# Patient Record
(demographics unavailable — no encounter records)

---

## 2024-12-13 NOTE — ASSESSMENT
[FreeTextEntry1] :  Ms. DE JESUS is a 67 year female nonsmoker with a history of CAD, anxiety, Colonic polyps, COVID 19 (9/2023), HLD, Hypothyroid, Osteoarthritis, Osteopenia, Prediabetes, seasonal/environmental Allergies, GERD who now comes in for an initial pulmonary evaluation for SOB, ? PAH, seasonal/environmental Allergies, GERD/esophageal spasms, ?JACKIE   The patient's SOB is felt to be multifactorial: -out of shape/overweight -poor mechanics of breathing -Pulmonary     -? PAH     -? mild asthma -Cardiac(Patcha)     -Valve issues   Problem 1: ? PAH -recommend RHC with Romel Osorio to see if PAH is real or not -Recommend CTA  of chest -recommend BNP bloodtest Consider V/Q scan if indeed pt has PAH CAT scans are the only radiological modality to identify abnormalities w/in the lungs with regards to nodules/masses/lymph nodes. Risks, benefits were reviewed in detail. The guidelines for abnormalities include follow up CT scans at various intervals which could range from 6 weeks to 1 year intervals. If there is a change for the worse then consideration for a biopsy will be considered if you are a candidate. Second opinion evaluation with a thoracic surgeon or an interventional radiologist could be offered. -PAH is a rare form of pulmonary hypertension that affects the arteries in the lungs and the right side of the heart, which is responsible for receiving blood that is low in oxygen and pumping it to the lungs for reoxygenation. There is slower blood flow to the lungs in PAH patients, which the heart tries to compensate for by working harder to pump blood through the lungs. This can cause damage to the organ and result in symptoms like shortness of breath, chest pain, and dizziness. There is no cure for PAH, and it often leads to right heart failure and death. Untreated, less than half of the patients survive beyond 3 years.  Problem 2: mild asthma (RF: allergies, GERD)   -add Albuterol via inhaler 2 puffs up to Q6H, pre-exercise PRN -Inhaler technique reviewed as well as oral hygiene technique reviewed with patient. Avoidance of cold air, extremes of temperature, rescue inhaler should be used before exercise. Order of medication reviewed with patient. Recommended use of a cool mist humidifier in the bedroom.  Asthma is believed to be caused by inherited (genetic) and environmental factor, but its exact is unknown. Asthma may be triggered by allergens, lung infections, or irritants in the air, Asthma triggers are different for each person.   Problem 3: AllergySinus -get Blood work to include: asthma panel, food IgE panel, IgE level, eosinophil level,vitamin D level - Azelastine/Flonase (PRN)  Environmental measures for allergies were encouraged including mattress and pillow cover, air purifier, and environmental controls.   Problem 4: GERD  -add Pepcid 40 mg QHS -recommend Reflux Gourmet  -Rule of 2s: avoid eating too much, eating too late, eating too spicy, eating two hours before bed. - Things to avoid including overeating, spicy foods, tight clothing, eating within two hours of bed, this list is not all inclusive. - For treatments of reflux, possible options discussed including diet control, H2 blockers, PPIs, as well as coating motility agents discussed as treatment options. Timing of meals and proximity of last meal to sleep were discussed. If symptoms persist, a formal gastrointestinal evaluation is needed.   Problem 5: r/o JACKIE (associated with PAH) -complete home sleep study -Sleep apnea is associated with adverse clinical consequences which can affect most organ systems. Cardiovascular disease risk includes arrhythmias, atrial fibrillation, hypertension, coronary artery disease, and stroke. Metabolic disorders include diabetes type 2, non-alcoholic fatty liver disease. Mood disorder especially depression; and cognitive decline especially in the elderly. Associations with chronic reflux/Barretts esophagus some but not all inclusive. -Reasons include arousal consistent with hypopnea; respiratory events most prominent in REM sleep or supine position; therefore sleep staging and body position are important for accurate diagnosis and estimation of AHI.   Problem 6: Cardiac (Valve issues) -Recommend cardiac follow up evaluation with cardiologist if needed(Siddhartha)   Problem 7: overweight/out of shape -Recommended Gopal Horton's 10-day detox diet and book. - Weight loss, exercise and diet control were discussed and are highly encouraged. Treatment options were given such as aqua therapy, and contacting a nutritionist. Recommended to use the elliptical, stationary bike, less use of treadmill. Mindful eating was explained to the patient. Obesity is associated with worsening asthma, SOB, and potential for cardiac disease, diabetes, and other underlying medical conditions.   Problem 8: Poor mechanics of breathing -Recommended Wisanti Hof and Buteyko breathing techniques -Recommended [email or web address] and to YouTube "aerobic exercises for seniors" -Proper breathing techniques were reviewed with an emphasis on exhalation. Patient instructed to breath in for 1 second and out for four seconds. Patient was encouraged not to talk while walking.   Problem 9: Health Maintenance -recommended Sanotize anti viral nasal spray in case of viral infection -s/p flu shot -recommended strep pneumonia vaccines: Prevnar-20 vaccine, follow by Pneumo vaccine 23 one year following -recommended early intervention for URIs -recommended regular osteoporosis evaluations -recommended early dermatological evaluations -recommended after the age of 50 to the age of 70, colonoscopy every 5 years   f/u in 6-8 weeks pt is encouraged to call or fax the office with any questions or concerns.

## 2024-12-13 NOTE — HISTORY OF PRESENT ILLNESS
[TextBox_4] :  Ms. DE JESUS is a 67 year female nonsmoker with a history of CAD, anxiety, Colonic polyps, COVID 19 (9/2023), HLD, Hypothyroid, Osteoarthritis, Osteopenia, Prediabetes, , seasonal/environmental allergies, GERD who now comes in for an initial pulmonary evaluation. Her chief complaint is  -she notes she feels today her chest feels a little heavy -she denies feeling SOB and no BARON -she notes she think she had COVID 19 3 times but was diagnosed once and when she had it, she felt like it was a cold and was on Paxlovid with relief -she notes sinuses are active and takes an Allegra to control it -she notes allergy sx have itchy eyes and ears and also has esophageal spasms -she notes sometimes when she is anxious, she has chest pain -she notes some difficulty swallowing -she notes she wants to lose more weight -she notes some lower back pain -she notes she has issues falling asleep right away but when she does asleep, she can stay asleep. She notes she has never been a great sleeper -she notes she feels exhausted -she notes she has some issues with memory    -Patient denies any headaches, nausea, vomiting, fever, chills, sweats, chest pain, palpitations, coughing, wheezing, diarrhea, constipation, dysphagia, arthralgias, dizziness, leg swelling, leg pain, itchy eyes, itchy ears, dysphonia, heartburn, reflux or sour taste in mouth.

## 2024-12-13 NOTE — PROCEDURE
[FreeTextEntry1] :  CXR revealed a normal sized heart; there was no evidence of infiltrate or effusion -- A normal appearing chest radiograph.   Full PFT reveals normal flows; FEV1 was 2.30 L which is 97 %,  with a 15% improvement on Bronchodilator of predicted, normal lung volumes, normal diffusions, at 14.49 L which is 72% predicted, normal flow volume loop. PFT's for performed to evaluate for SOB.    Six Minute Walk test reveals a low saturation of 92% with no evidence of Dyspnea or Fatigue; walked 440.1 meters.   FENO was 13; a normal value being less than 25. Fractional exhaled nitric oxide (FENO) is regarded as a simple, noninvasive method for assessing eosinophilic airway inflammation. Produced by a variety of cells within the lung, nitric oxide (NO) concentrations are generally low in healthy individuals. However, high concentrations of NO appear to be involved in nonspecific host defense mechanisms and chronic inflammatory diseases such as asthma. The American Thoracic Society (ATS) therefore has strongly recommended using FENO to aid in the assessment, management, and long-term monitoring of eosinophilic airway inflammation and asthma, and for identifying steroid responsive individuals whose chronic respiratory symptoms may be caused by airway inflammation. In their 2011 clinical practice guideline, the ATS emphasizes the importance of using FENO.

## 2024-12-13 NOTE — ADDENDUM
[FreeTextEntry1] :  Documented by Vikram Mg acting as a scribe for Dr. Isak Rizo on 12/13/2024 .   All medical record entries made by the Scribe were at my, Dr. Isak Rizo's direction and personally dictated by me on 12/13/2024 . I have reviewed the chart and agree that the record accurately reflects my personal performance of the history, Physical exam, assessment, and plan. I have also personally directed, reviewed, and agree with the discharge instructions.

## 2024-12-13 NOTE — RESULTS/DATA
[TextEntry] : Echo(12/2024) reveals elevated right ventral systolic pressure 11.6, mild pulmonary HTN  CXR(11/2024) reveals no active pulmonary disease

## 2024-12-13 NOTE — PHYSICAL EXAM
[No Acute Distress] : no acute distress [Normal Oropharynx] : normal oropharynx [III] : Mallampati Class: III [Normal Appearance] : normal appearance [No Neck Mass] : no neck mass [Normal Rate/Rhythm] : normal rate/rhythm [Normal S1, S2] : normal s1, s2 [No Murmurs] : no murmurs [No Resp Distress] : no resp distress [Clear to Auscultation Bilaterally] : clear to auscultation bilaterally [No Abnormalities] : no abnormalities [Benign] : benign [Normal Gait] : normal gait [No Clubbing] : no clubbing [No Cyanosis] : no cyanosis [No Edema] : no edema [FROM] : FROM [Normal Color/ Pigmentation] : normal color/ pigmentation [No Focal Deficits] : no focal deficits [Oriented x3] : oriented x3 [Normal Affect] : normal affect [TextBox_2] : M3 [TextBox_68] :  I:E 1:3; Clear

## 2024-12-13 NOTE — REASON FOR VISIT
[Initial] : an initial visit [TextBox_44] :  SOB, ? PAH, , seasonal/environmental Allergies, GERD/esophageal spasms, ?JACKIE

## 2024-12-23 NOTE — HISTORY OF PRESENT ILLNESS
[Patient reported bone density results were abnormal] : Patient reported bone density results were abnormal [Currently Active] : currently active [TextBox_4] : Reva is a 66 y/o  who presents today for an annual exam with request for estradiol refill.   She has a history of osteopenia and is due for a repeat DEXA . She is s/p a mammo today.  She will be undergoing cardiac work up for possible pulmonary hypertension [Mammogramdate] : 12/22/23 [BoneDensityDate] : 12/21/22 [TextBox_37] : osteopenia [ColonoscopyDate] : 12/2024

## 2025-01-09 NOTE — HISTORY OF PRESENT ILLNESS
[TextBox_4] :  Ms. DE JESUS is a 67 year female nonsmoker with a history of CAD, anxiety, Colonic polyps, COVID 19 (9/2023), HLD, Hypothyroid, Osteoarthritis, Osteopenia, Prediabetes, , seasonal/environmental allergies, GERD who now comes in for a follow-up pulmonary evaluation. Her chief complaint is  -she notes she is on a Medrol Dosepak for an infection -she notes s/p cardiac catheterization which reveals PAH -she notes sometimes having some chest pressure, but she is more aware of it now -she notes she hasn't needed her inhaler -she notes good quality of sleep  -she denies any headaches, nausea, emesis, fever, chills, sweats, chest pain, chest pressure, coughing, wheezing, palpitations, diarrhea, constipation, dysphagia, vertigo, arthralgias, myalgias, leg swelling, itchy eyes, itchy ears, heartburn, reflux, or sour taste in the mouth.

## 2025-01-09 NOTE — PROCEDURE
[FreeTextEntry1] : RHC reveals systolic pressure SPAP 36 DPAP 15 MPAP 24 (elevated) no change with nitric oxide (non-responder) PVR 2.6 TORRES /84 PCWP 10

## 2025-01-09 NOTE — REASON FOR VISIT
[Follow-Up] : a follow-up visit [TextBox_44] :  SOB, (+) PAH, , seasonal/environmental Allergies, GERD/esophageal spasms, ?JACKIE

## 2025-01-09 NOTE — ADDENDUM
[FreeTextEntry1] : Documented by Cleveland Toney acting as a scribe for Dr. Isak Rizo on 01/09/2025. All medical record entries made by the Scribe were at my, Dr. Isak Rizo's, direction and personally dictated by me on 01/09/2025. I have reviewed the chart and agree that the record accurately reflects my personal performance of the history, physical exam, assessment and plan. I have also personally directed, reviewed, and agree with the discharge instructions.

## 2025-01-09 NOTE — PHYSICAL EXAM
[No Acute Distress] : no acute distress [Normal Oropharynx] : normal oropharynx [III] : Mallampati Class: III [Normal Appearance] : normal appearance [No Neck Mass] : no neck mass [Normal Rate/Rhythm] : normal rate/rhythm [Normal S1, S2] : normal s1, s2 [No Murmurs] : no murmurs [No Resp Distress] : no resp distress [Clear to Auscultation Bilaterally] : clear to auscultation bilaterally [No Abnormalities] : no abnormalities [Benign] : benign [Normal Gait] : normal gait [No Clubbing] : no clubbing [No Cyanosis] : no cyanosis [No Edema] : no edema [FROM] : FROM [Normal Color/ Pigmentation] : normal color/ pigmentation [No Focal Deficits] : no focal deficits [Oriented x3] : oriented x3 [Normal Affect] : normal affect [TextBox_68] :  I:E 1:3; Clear

## 2025-01-09 NOTE — ASSESSMENT
[FreeTextEntry1] :  Ms. DE JESUS is a 67 year female nonsmoker with a history of CAD, anxiety, Colonic polyps, COVID 19 (9/2023), HLD, Hypothyroid, Osteoarthritis, Osteopenia, Prediabetes, seasonal/environmental Allergies, GERD who now comes in for a follow-up pulmonary evaluation for SOB, (+) PAH, seasonal/environmental Allergies, GERD/esophageal spasms, ?JACKIE - now with PAH confirmed   The patient's SOB is felt to be multifactorial: -out of shape/overweight -poor mechanics of breathing -Pulmonary     -(+) PAH     -? mild asthma -Cardiac(Patcha)     -Valve issues   Problem 1: (+) PAH - WHO group 1 -s/p RHC with Romel Osorio (confirmed) -s/p CTA  of chest (+) -s/p BNP blood test WNL - move to combination -add Opsynvi daily (ETRA/PDE-5) -follow-up LFTs/CBC monthly -follow-up BNP, 6-minute walk test, PFTs, and yearly ECHO Consider V/Q scan if indeed pt has PAH CAT scans are the only radiological modality to identify abnormalities w/in the lungs with regards to nodules/masses/lymph nodes. Risks, benefits were reviewed in detail. The guidelines for abnormalities include follow up CT scans at various intervals which could range from 6 weeks to 1 year intervals. If there is a change for the worse then consideration for a biopsy will be considered if you are a candidate. Second opinion evaluation with a thoracic surgeon or an interventional radiologist could be offered. -PAH is a rare form of pulmonary hypertension that affects the arteries in the lungs and the right side of the heart, which is responsible for receiving blood that is low in oxygen and pumping it to the lungs for reoxygenation. There is slower blood flow to the lungs in PAH patients, which the heart tries to compensate for by working harder to pump blood through the lungs. This can cause damage to the organ and result in symptoms like shortness of breath, chest pain, and dizziness. There is no cure for PAH, and it often leads to right heart failure and death. Untreated, less than half of the patients survive beyond 3 years.  Problem 2: mild asthma (RF: allergies, GERD)   -add Albuterol via inhaler 2 puffs up to Q6H, pre-exercise PRN -Inhaler technique reviewed as well as oral hygiene technique reviewed with patient. Avoidance of cold air, extremes of temperature, rescue inhaler should be used before exercise. Order of medication reviewed with patient. Recommended use of a cool mist humidifier in the bedroom.  Asthma is believed to be caused by inherited (genetic) and environmental factor, but its exact is unknown. Asthma may be triggered by allergens, lung infections, or irritants in the air, Asthma triggers are different for each person.   Problem 3: AllergySinus -s/p Blood work to include: asthma panel (+), food IgE panel(-), IgE level(-), eosinophil level (-), vitamin D level - Azelastine/Flonase (PRN)  Environmental measures for allergies were encouraged including mattress and pillow cover, air purifier, and environmental controls.  Problem 3A: Low Vitamin D -on Rx -Low vitamin D levels have been associated with asthma exacerbations and increased allergic symptoms. The goal, based on recent information, is maintaining levels between 50-70 and low normal is 30. Recommended 50,000 units every two weeks to once a month depending on the level.   Problem 4: GERD  -add Pepcid 40 mg QHS -recommend Reflux Gourmet  -Rule of 2s: avoid eating too much, eating too late, eating too spicy, eating two hours before bed. - Things to avoid including overeating, spicy foods, tight clothing, eating within two hours of bed, this list is not all inclusive. - For treatments of reflux, possible options discussed including diet control, H2 blockers, PPIs, as well as coating motility agents discussed as treatment options. Timing of meals and proximity of last meal to sleep were discussed. If symptoms persist, a formal gastrointestinal evaluation is needed.   Problem 5: r/o JACKIE (associated with PAH)- NC -complete home sleep study -Sleep apnea is associated with adverse clinical consequences which can affect most organ systems. Cardiovascular disease risk includes arrhythmias, atrial fibrillation, hypertension, coronary artery disease, and stroke. Metabolic disorders include diabetes type 2, non-alcoholic fatty liver disease. Mood disorder especially depression; and cognitive decline especially in the elderly. Associations with chronic reflux/Barretts esophagus some but not all inclusive. -Reasons include arousal consistent with hypopnea; respiratory events most prominent in REM sleep or supine position; therefore sleep staging and body position are important for accurate diagnosis and estimation of AHI.   Problem 6: Cardiac (Valve issues) -Recommend cardiac follow up evaluation with cardiologist if needed(Sulaiman Schneider   Problem 7: overweight/out of shape -Recommended Gopal Horton's 10-day detox diet and book. - Weight loss, exercise and diet control were discussed and are highly encouraged. Treatment options were given such as aqua therapy, and contacting a nutritionist. Recommended to use the elliptical, stationary bike, less use of treadmill. Mindful eating was explained to the patient. Obesity is associated with worsening asthma, SOB, and potential for cardiac disease, diabetes, and other underlying medical conditions.   Problem 8: Poor mechanics of breathing -Recommended Wim Hof and Buteyko breathing techniques -Recommended [email or web address] and to YouTube "aerobic exercises for seniors" -Proper breathing techniques were reviewed with an emphasis on exhalation. Patient instructed to breath in for 1 second and out for four seconds. Patient was encouraged not to talk while walking.   Problem 9: Health Maintenance -recommended pulmonary rehab ([facility]) -s/p RSV  -recommended Sanotize anti viral nasal spray in case of viral infection -s/p flu shot -recommended strep pneumonia vaccines: Prevnar-20 vaccine, follow by Pneumo vaccine 23 one year following -recommended early intervention for URIs -recommended regular osteoporosis evaluations -recommended early dermatological evaluations -recommended after the age of 50 to the age of 70, colonoscopy every 5 years   f/u in 6-8 weeks pt is encouraged to call or fax the office with any questions or concerns.

## 2025-01-23 NOTE — REVIEW OF SYSTEMS
[Fatigue] : fatigue [Cough] : cough [Fever] : no fever [Chills] : no chills [Earache] : no earache [Nocturia] : no nocturia [Joint Swelling] : no joint swelling [Anxiety] : no anxiety

## 2025-01-23 NOTE — HISTORY OF PRESENT ILLNESS
[FreeTextEntry8] :  67 year female nonsmoker with a history of CAD, anxiety, Colonic polyps, COVID 19 (9/2023), HLD, Hypothyroid, Osteoarthritis, Osteopenia, Prediabetes, , seasonal/environmental allergies, GERD, new diagnosis of Pulmonary HTN, mod JACKIE  s  initial illness 2-3 weeks was at  for negative for flu covid completed Medrol pack, got script for agumentin did not take Now new symtoms1/17/24 went to  1/19/ 25 negative flu and covid, advised Augmentin(she started as already had) and advised benzoate now on day 3 Augmentin, did not start the benzoate due to concern interaction with bupropion.

## 2025-01-23 NOTE — PHYSICAL EXAM
[No Acute Distress] : no acute distress [Well-Appearing] : well-appearing [Normal Outer Ear/Nose] : the outer ears and nose were normal in appearance [Normal Oropharynx] : the oropharynx was normal [Clear to Auscultation] : lungs were clear to auscultation bilaterally [Soft] : abdomen soft [Non-distended] : non-distended [Normal Posterior Cervical Nodes] : no posterior cervical lymphadenopathy [Normal Anterior Cervical Nodes] : no anterior cervical lymphadenopathy [Normal Gait] : normal gait [Normal Affect] : the affect was normal

## 2025-01-27 NOTE — HISTORY OF PRESENT ILLNESS
[FreeTextEntry8] : Acute care visit  Patient still not feeling well, increased cough and mucous She became sick on 1/17, she was evaluated at Urgent Care on 1/19 and prescribed Augmentin She was seen in our office on 1/23, because she was not getting better.  stopped augmentin and started Zpack She has finished Zpack and still feels chest tightness, congestion, deep cough, cough spasms, mild wheeze She was diagnosed with mild asthma and PAH in 2024.

## 2025-02-04 NOTE — HISTORY OF PRESENT ILLNESS
[TextBox_4] :  Ms. DE JESUS is a 67 year female nonsmoker with a history of CAD, anxiety, Colonic polyps, COVID 19 (9/2023), HLD, Hypothyroid, Osteoarthritis, Osteopenia, Prediabetes, seasonal/environmental allergies, GERD who now comes in for a follow-up pulmonary evaluation. Her chief complaint is  -she notes s/p URI -she notes chest pressure  -she notes being put on a Z pack  -she notes frequent cough  -she notes being put on a Medrol dose pack  -she notes her cough is no longer productive  -she notes being in denial about the accuracy of the exam  -she notes needing to go to PT  -she denies exercising -she notes possibly having osteoporosis   -she denies any headaches, nausea, emesis, fever, chills, sweats, chest pain, chest pressure, wheezing, palpitations, diarrhea, constipation, dysphagia, vertigo, myalgias, leg swelling, itchy eyes, itchy ears, heartburn, reflux, or sour taste in the mouth.

## 2025-02-04 NOTE — ASSESSMENT
[FreeTextEntry1] :  Ms. DE JESUS is a 67 year female nonsmoker with a history of CAD, anxiety, Colonic polyps, COVID 19 (9/2023), HLD, Hypothyroid, Osteoarthritis, Osteopenia, Prediabetes, seasonal/environmental Allergies, GERD who now comes in for a follow-up pulmonary evaluation for SOB, (+) PAH, seasonal/environmental Allergies, GERD/esophageal spasms, ?JACKIE - now with PAH confirmed, OSAS; post URI bronchospasm    The patient's SOB is felt to be multifactorial: -out of shape/overweight -poor mechanics of breathing -Pulmonary     -(+) PAH     -? mild asthma -Cardiac([place])     -Valve issues   Problem 1: (+) PAH - WHO group 1 -s/p RHC with Romel Osorio (confirmed) -s/p CTA  of chest (+) -s/p BNP blood test WNL - move to combination -add Opsynvi daily (ETRA/PDE-5) -follow-up LFTs/CBC monthly -follow-up BNP, 6-minute walk test, PFTs, and yearly ECHO Consider V/Q scan if indeed pt has PAH CAT scans are the only radiological modality to identify abnormalities w/in the lungs with regards to nodules/masses/lymph nodes. Risks, benefits were reviewed in detail. The guidelines for abnormalities include follow up CT scans at various intervals which could range from 6 weeks to 1 year intervals. If there is a change for the worse then consideration for a biopsy will be considered if you are a candidate. Second opinion evaluation with a thoracic surgeon or an interventional radiologist could be offered. -PAH is a rare form of pulmonary hypertension that affects the arteries in the lungs and the right side of the heart, which is responsible for receiving blood that is low in oxygen and pumping it to the lungs for reoxygenation. There is slower blood flow to the lungs in PAH patients, which the heart tries to compensate for by working harder to pump blood through the lungs. This can cause damage to the organ and result in symptoms like shortness of breath, chest pain, and dizziness. There is no cure for PAH, and it often leads to right heart failure and death. Untreated, less than half of the patients survive beyond 3 years.  Problem 2: mild asthma (RF: allergies, GERD) (active)    -add Albuterol via inhaler 2 puffs up to Q6H, pre-exercise PRN -add Symbicort 160 2 inhalations BID -Inhaler technique reviewed as well as oral hygiene technique reviewed with patient. Avoidance of cold air, extremes of temperature, rescue inhaler should be used before exercise. Order of medication reviewed with patient. Recommended use of a cool mist humidifier in the bedroom.  Asthma is believed to be caused by inherited (genetic) and environmental factor, but its exact is unknown. Asthma may be triggered by allergens, lung infections, or irritants in the air, Asthma triggers are different for each person.   Problem 3: AllergySinus -s/p Blood work to include: asthma panel (+), food IgE panel(-), IgE level(-), eosinophil level (-), vitamin D level - Azelastine/Flonase (PRN)  Environmental measures for allergies were encouraged including mattress and pillow cover, air purifier, and environmental controls.  Problem 3A: Low Vitamin D -on Rx -Low vitamin D levels have been associated with asthma exacerbations and increased allergic symptoms. The goal, based on recent information, is maintaining levels between 50-70 and low normal is 30. Recommended 50,000 units every two weeks to once a month depending on the level.   Problem 4: GERD  -add Pepcid 40 mg QHS -recommend Reflux Gourmet  -Rule of 2s: avoid eating too much, eating too late, eating too spicy, eating two hours before bed. - Things to avoid including overeating, spicy foods, tight clothing, eating within two hours of bed, this list is not all inclusive. - For treatments of reflux, possible options discussed including diet control, H2 blockers, PPIs, as well as coating motility agents discussed as treatment options. Timing of meals and proximity of last meal to sleep were discussed. If symptoms persist, a formal gastrointestinal evaluation is needed.   Problem 5: (+) JACKIE (associated with PAH)- 17.4 (moderate) -s/p  home sleep study - [facility]  -Sleep apnea is associated with adverse clinical consequences which can affect most organ systems. Cardiovascular disease risk includes arrhythmias, atrial fibrillation, hypertension, coronary artery disease, and stroke. Metabolic disorders include diabetes type 2, non-alcoholic fatty liver disease. Mood disorder especially depression; and cognitive decline especially in the elderly. Associations with chronic reflux/Barretts esophagus some but not all inclusive. -Reasons include arousal consistent with hypopnea; respiratory events most prominent in REM sleep or supine position; therefore sleep staging and body position are important for accurate diagnosis and estimation of AHI.   Problem 6: Cardiac (Valve issues) -Recommend cardiac follow up evaluation with cardiologist if neededRomel Schneider ([place])   Problem 7: overweight/out of shape -Recommended Gopal Horton's 10-day detox diet and book. - Weight loss, exercise and diet control were discussed and are highly encouraged. Treatment options were given such as aqua therapy, and contacting a nutritionist. Recommended to use the elliptical, stationary bike, less use of treadmill. Mindful eating was explained to the patient. Obesity is associated with worsening asthma, SOB, and potential for cardiac disease, diabetes, and other underlying medical conditions.   Problem 8: Poor mechanics of breathing -Recommended Saman Skelton breathing techniques -Recommended [email or web address] and to YouTube "aerobic exercises for seniors" -Proper breathing techniques were reviewed with an emphasis on exhalation. Patient instructed to breath in for 1 second and out for four seconds. Patient was encouraged not to talk while walking.   Problem 9: Health Maintenance -recommended pulmonary rehab ([facility]) -s/p RSV  -recommended Sanotize anti viral nasal spray in case of viral infection -s/p flu shot -recommended strep pneumonia vaccines: Prevnar-20 vaccine, follow by Pneumo vaccine 23 one year following -recommended early intervention for URIs -recommended regular osteoporosis evaluations -recommended early dermatological evaluations -recommended after the age of 50 to the age of 70, colonoscopy every 5 years   f/u in 6-8 weeks pt is encouraged to call or fax the office with any questions or concerns.

## 2025-02-04 NOTE — ADDENDUM
[FreeTextEntry1] : Documented by Omid Barrera acting as a scribe for Dr. Isak Rizo on 02/04/2025. All medical record entries made by the Scribe were at my, Dr. Isak Rizo's, direction and personally dictated by me on 02/04/2025. I have reviewed the chart and agree that the record accurately reflects my personal performance of the history, physical exam, assessment and plan. I have also personally directed, reviewed, and agree with the discharge instructions.

## 2025-02-04 NOTE — REASON FOR VISIT
[Follow-Up] : a follow-up visit [TextBox_44] :  SOB, (+) PAH, , seasonal/environmental Allergies, GERD/esophageal spasms, (+)JACKIE

## 2025-02-04 NOTE — PROCEDURE
[FreeTextEntry1] : RHC reveals systolic pressure SPAP 36 DPAP 15 MPAP 24 (elevated) no change with nitric oxide (non-responder) PVR 2.6 TORRES /84 PCWP 10   Sleep study (1/2025) revealed mild sleep apnea with an AHI/JAYCE of 18

## 2025-02-13 NOTE — REVIEW OF SYSTEMS
[Negative] : Psychiatric [Nasal Congestion] : nasal congestion [Cough] : cough [SOB on Exertion] : sob on exertion [Thyroid Problem] : thyroid problem

## 2025-02-13 NOTE — HISTORY OF PRESENT ILLNESS
[TextBox_4] :  Ms. DE JESUS is a 67 year female nonsmoker with a history of CAD, anxiety, Colonic polyps, COVID 19 (9/2023), HLD, Hypothyroid, Osteoarthritis, Osteopenia, Prediabetes, seasonal/environmental allergies, GERD who now comes in for a follow-up pulmonary evaluation. Her chief complaint is  -reports she still has nasal congestion that is lingering after recent URI in 1/2025 s/p medrol x 2 and Zpack; still feeling fatigue and voice is not normal -reports is taking symbicort -reports using allegra; not using nasal sprays -reports she is starting pulmonary rehab -reports taking OTC Pepcid -reports she has appt with Dr. Correa for DD next week -reports she takes a long time falling asleep -reports feeling overwhelmed with recent diagnoses of PAH and osteoporosis  -she denies any headaches, nausea, emesis, fever, chills, sweats, chest pain, chest pressure, wheezing, palpitations, diarrhea, constipation, dysphagia, vertigo, myalgias, leg swelling, itchy eyes, itchy ears, heartburn, reflux, or sour taste in the mouth.

## 2025-02-13 NOTE — ASSESSMENT
[FreeTextEntry1] :  Ms. DE JESUS is a 67 year female nonsmoker with a history of CAD, anxiety, Colonic polyps, COVID 19 (9/2023), HLD, Hypothyroid, Osteoarthritis, Osteopenia, Prediabetes, now comes in for a follow-up pulmonary evaluation for SOB, (+) PAH, seasonal/environmental Allergies, GERD/esophageal spasms, moderate JACKIE - now with PAH confirmed, post URI bronchospasm    The patient's SOB is felt to be multifactorial: -deconditioned -poor mechanics of breathing -Pulmonary  -(+) PAH  -? mild asthma -Cardiac(Patcha)  -Valve issues   Problem 1: URI (slowly resolving) -s/p Z pack (1/2025) -s/p Medrol pack x 2 (1/2025)  Problem 2: PND/Sinus/allergies (active) -Add Azelastine 1 spray BID -Add Flonase 1 spray BID -continue Allegra -Recommended Alkolol rinse -s/p Blood work to include: asthma panel (+), food IgE panel(-), IgE level(-), eosinophil level (-), vitamin D level  Environmental measures for allergies were encouraged including mattress and pillow cover, air purifier, and environmental controls.  Problem 2A: Low Vitamin D -on Rx -Low vitamin D levels have been associated with asthma exacerbations and increased allergic symptoms. The goal, based on recent information, is maintaining levels between 50-70 and low normal is 30. Recommended 50,000 units every two weeks to once a month depending on the level.  Problem 3: (+) PAH - WHO group 1 -s/p RHC with Romel Osorio (confirmed) -s/p CTA  of chest (+) -s/p BNP blood test WNL - move to combination -add Opsynvi daily (ETRA/PDE-5)- pending. -follow-up LFTs/CBC monthly -follow-up BNP, 6-minute walk test, PFTs, and yearly ECHO Consider V/Q scan if indeed pt has PAH CAT scans are the only radiological modality to identify abnormalities w/in the lungs with regards to nodules/masses/lymph nodes. Risks, benefits were reviewed in detail. The guidelines for abnormalities include follow up CT scans at various intervals which could range from 6 weeks to 1 year intervals. If there is a change for the worse then consideration for a biopsy will be considered if you are a candidate. Second opinion evaluation with a thoracic surgeon or an interventional radiologist could be offered. -PAH is a rare form of pulmonary hypertension that affects the arteries in the lungs and the right side of the heart, which is responsible for receiving blood that is low in oxygen and pumping it to the lungs for reoxygenation. There is slower blood flow to the lungs in PAH patients, which the heart tries to compensate for by working harder to pump blood through the lungs. This can cause damage to the organ and result in symptoms like shortness of breath, chest pain, and dizziness. There is no cure for PAH, and it often leads to right heart failure and death. Untreated, less than half of the patients survive beyond 3 years.  Problem 4: mild asthma (RF: allergies, GERD)  -continue Symbicort 160 2 inhalations BID (rinse and gargle)   -continue Albuterol via inhaler 2 puffs up to Q6H, pre-exercise PRN  Asthma is believed to be caused by inherited (genetic) and environmental factor, but its exact is unknown. Asthma may be triggered by allergens, lung infections, or irritants in the air, Asthma triggers are different for each person.   Problem 5: GERD  -continue Pepcid 40 mg QHS  -Rule of 2s: avoid eating too much, eating too late, eating too spicy, eating two hours before bed. - Things to avoid including overeating, spicy foods, tight clothing, eating within two hours of bed, this list is not all inclusive. - For treatments of reflux, possible options discussed including diet control, H2 blockers, PPIs, as well as coating motility agents discussed as treatment options. Timing of meals and proximity of last meal to sleep were discussed. If symptoms persist, a formal gastrointestinal evaluation is needed.   Problem 6: (+) JACKIE with nocturnal hypoxemia (associated with PAH) -s/p  home sleep study AHI 17.4 (moderate) with desaturations <88% for 80 min - Dr. alves -DD; confirmed DD as treatment plan with Dr. Rizo  -Plan for HST with DD efficacy study after DD obtained -Sleep apnea is associated with adverse clinical consequences which can affect most organ systems. Cardiovascular disease risk includes arrhythmias, atrial fibrillation, hypertension, coronary artery disease, and stroke. Metabolic disorders include diabetes type 2, non-alcoholic fatty liver disease. Mood disorder especially depression; and cognitive decline especially in the elderly. Associations with chronic reflux/Barretts esophagus some but not all inclusive. -Reasons include arousal consistent with hypopnea; respiratory events most prominent in REM sleep or supine position; therefore sleep staging and body position are important for accurate diagnosis and estimation of AHI.   Problem 7: Cardiac (Valve issues) -Recommend cardiac follow up evaluation with cardiologist if needed (Siddhartha)    Problem 8: Weight management -Recommended Gopal Horton's 10-day detox diet and book. - Weight loss, exercise and diet control were discussed and are highly encouraged. Treatment options were given such as aqua therapy, and contacting a nutritionist. Recommended to use the elliptical, stationary bike, less use of treadmill. Mindful eating was explained to the patient. Obesity is associated with worsening asthma, SOB, and potential for cardiac disease, diabetes, and other underlying medical conditions.   Problem 9: Poor mechanics of breathing -Recommended Saman Skelton breathing techniques -Recommended [email or web address] and to YouTube "aerobic exercises for seniors" -Proper breathing techniques were reviewed with an emphasis on exhalation. Patient instructed to breath in for 1 second and out for four seconds. Patient was encouraged not to talk while walking.   Problem 10: Health Maintenance -recommended pulmonary rehab ([facility]) -s/p RSV  -s/p flu shot -recommended strep pneumonia vaccines: Prevnar-20 vaccine, follow by Pneumo vaccine 23 one year following -recommended early intervention for URIs -recommended regular osteoporosis evaluations -recommended early dermatological evaluations -recommended after the age of 50 to the age of 70, colonoscopy every 5 years   f/u in March with Dr Rizo pt is encouraged to call or fax the office with any questions or concerns.

## 2025-02-13 NOTE — ASSESSMENT
[FreeTextEntry1] :  Ms. DE JESUS is a 67 year female nonsmoker with a history of CAD, anxiety, Colonic polyps, COVID 19 (9/2023), HLD, Hypothyroid, Osteoarthritis, Osteopenia, Prediabetes, now comes in for a follow-up pulmonary evaluation for SOB, (+) PAH, seasonal/environmental Allergies, GERD/esophageal spasms, moderate JACKIE - now with PAH confirmed, post URI bronchospasm    The patient's SOB is felt to be multifactorial: -deconditioned -poor mechanics of breathing -Pulmonary  -(+) PAH  -? mild asthma -Cardiac(Patch[place])  -Valve issues   Problem 1: URI (slowly resolving) -s/p Z pack (1/2025) -s/p Medrol pack x 2 (1/2025)  Problem 2: PND/Sinus/allergies (active) -Add Azelastine 1 spray BID -Add Flonase 1 spray BID -continue Allegra -Recommended Alkolol rinse -s/p Blood work to include: asthma panel (+), food IgE panel(-), IgE level(-), eosinophil level (-), vitamin D level  Environmental measures for allergies were encouraged including mattress and pillow cover, air purifier, and environmental controls.  Problem 2A: Low Vitamin D -on Rx -Low vitamin D levels have been associated with asthma exacerbations and increased allergic symptoms. The goal, based on recent information, is maintaining levels between 50-70 and low normal is 30. Recommended 50,000 units every two weeks to once a month depending on the level.  Problem 3: (+) PAH - WHO group 1 -s/p RHC with Roeml Osorio (confirmed) -s/p CTA  of chest (+) -s/p BNP blood test WNL - move to combination -add Opsynvi daily (ETRA/PDE-5)- pending. -follow-up LFTs/CBC monthly -follow-up BNP, 6-minute walk test, PFTs, and yearly ECHO Consider V/Q scan if indeed pt has PAH CAT scans are the only radiological modality to identify abnormalities w/in the lungs with regards to nodules/masses/lymph nodes. Risks, benefits were reviewed in detail. The guidelines for abnormalities include follow up CT scans at various intervals which could range from 6 weeks to 1 year intervals. If there is a change for the worse then consideration for a biopsy will be considered if you are a candidate. Second opinion evaluation with a thoracic surgeon or an interventional radiologist could be offered. -PAH is a rare form of pulmonary hypertension that affects the arteries in the lungs and the right side of the heart, which is responsible for receiving blood that is low in oxygen and pumping it to the lungs for reoxygenation. There is slower blood flow to the lungs in PAH patients, which the heart tries to compensate for by working harder to pump blood through the lungs. This can cause damage to the organ and result in symptoms like shortness of breath, chest pain, and dizziness. There is no cure for PAH, and it often leads to right heart failure and death. Untreated, less than half of the patients survive beyond 3 years.  Problem 4: mild asthma (RF: allergies, GERD)  -continue Symbicort 160 2 inhalations BID (rinse and gargle)   -continue Albuterol via inhaler 2 puffs up to Q6H, pre-exercise PRN  Asthma is believed to be caused by inherited (genetic) and environmental factor, but its exact is unknown. Asthma may be triggered by allergens, lung infections, or irritants in the air, Asthma triggers are different for each person.   Problem 5: GERD  -continue Pepcid 40 mg QHS  -Rule of 2s: avoid eating too much, eating too late, eating too spicy, eating two hours before bed. - Things to avoid including overeating, spicy foods, tight clothing, eating within two hours of bed, this list is not all inclusive. - For treatments of reflux, possible options discussed including diet control, H2 blockers, PPIs, as well as coating motility agents discussed as treatment options. Timing of meals and proximity of last meal to sleep were discussed. If symptoms persist, a formal gastrointestinal evaluation is needed.   Problem 6: (+) JACKIE with nocturnal hypoxemia (associated with PAH) -s/p  home sleep study AHI 17.4 (moderate) with desaturations <88% for 80 min - Dr. alves -DD; confirmed DD as treatment plan with Dr. Rizo  -Plan for HST with DD efficacy study after DD obtained -Sleep apnea is associated with adverse clinical consequences which can affect most organ systems. Cardiovascular disease risk includes arrhythmias, atrial fibrillation, hypertension, coronary artery disease, and stroke. Metabolic disorders include diabetes type 2, non-alcoholic fatty liver disease. Mood disorder especially depression; and cognitive decline especially in the elderly. Associations with chronic reflux/Barretts esophagus some but not all inclusive. -Reasons include arousal consistent with hypopnea; respiratory events most prominent in REM sleep or supine position; therefore sleep staging and body position are important for accurate diagnosis and estimation of AHI.   Problem 7: Cardiac (Valve issues) -Recommend cardiac follow up evaluation with cardiologist if needed (Siddhartha)    Problem 8: Weight management -Recommended Gopal Horton's 10-day detox diet and book. - Weight loss, exercise and diet control were discussed and are highly encouraged. Treatment options were given such as aqua therapy, and contacting a nutritionist. Recommended to use the elliptical, stationary bike, less use of treadmill. Mindful eating was explained to the patient. Obesity is associated with worsening asthma, SOB, and potential for cardiac disease, diabetes, and other underlying medical conditions.   Problem 9: Poor mechanics of breathing -Recommended Saman Skelton breathing techniques -Recommended [email or web address] and to YouTube "aerobic exercises for seniors" -Proper breathing techniques were reviewed with an emphasis on exhalation. Patient instructed to breath in for 1 second and out for four seconds. Patient was encouraged not to talk while walking.   Problem 10: Health Maintenance -recommended pulmonary [facility]hab ([facility]) -s/p RSV  -s/p flu shot -recommended strep pneumonia vaccines: Prevnar-20 vaccine, follow by Pneumo vaccine 23 one year following -recommended early intervention for URIs -recommended regular osteoporosis evaluations -recommended early dermatological evaluations -recommended after the age of 50 to the age of 70, colonoscopy every 5 years   f/u in March with Dr Rizo pt is encouraged to call or fax the office with any questions or concerns.

## 2025-02-19 NOTE — ADDENDUM
[FreeTextEntry1] : This note was written by Jadyn Hines on 02/19/2025 acting as scribe for Lizeth Akhtar, OTR/L, PA

## 2025-02-19 NOTE — PROCEDURE
[de-identified] : Indication: Osteoarthritis of the right knee   Consent: At this time, I have recommended an injection into the right knee. The risks and benefits of the procedure were discussed with the patient in detail.  Upon verbal consent of the patient, we proceeded with the injection as noted below.   Procedure: After a sterile prep, the patient underwent an injection of 9 mL of 1% Lidocaine (10mg/mL) without epinephrine and 1 mL of triamcinolone acetonide (40mg/mL) into the right knee. The patient tolerated the procedure well.  There were no complications.   :  [employer] Drug name:     Triamcinolone Acetonide Injectable Suspension USP NDC #:            [address]-0 Lot #:               [id number] Expiration:      08/31/2025

## 2025-02-19 NOTE — HISTORY OF PRESENT ILLNESS
Is This A New Presentation, Or A Follow-Up?: Skin Lesion
How Severe Is Your Skin Lesion?: mild
[de-identified] : The patient comes in today with right knee pain.
Has Your Skin Lesion Been Treated?: not been treated

## 2025-02-19 NOTE — DISCUSSION/SUMMARY
[de-identified] : At this time, due to osteoarthritis of the right knee, I recommended a cortisone injection (as noted above). I advised her to ice it.

## 2025-02-19 NOTE — DISCUSSION/SUMMARY
[FreeTextEntry1] : Patient counseled as per above  25 minutes were spent face to face in this visit with greater than 50% of the time spent counseling

## 2025-02-19 NOTE — PHYSICAL EXAM
[de-identified] : Right Knee: Range of Motion in Degrees                                             Claimant:              Normal: Flexion Active:                        135                135-degrees Flexion Passive:                     135                135-degrees Extension Active:                    0-5                 0-5-degrees Extension Passive:                 0-5                 0-5-degrees     No weakness to flexion/extension. No evidence of instability in the AP plane or varus or valgus stress. Negative Lachman. Negative pivot shift.  Negative anterior drawer test.  Negative posterior drawer test. Negative Zander. Negative Apley grind.  No medial or lateral joint line tenderness.  Positive tenderness over the medial and lateral facet of the patella.  Positive patellofemoral crepitations.  No lateral tilting patella.  No patella apprehension.  Positive crepitation in the medial and lateral femoral condyle.  No proximal or distal swelling, edema or tenderness.  No gross motor or sensory deficits.  Mild intra-articular swelling. 2+ DP and PT pulses. No varus or valgus malalignment. Skin is intact. No rashes, scars or lesions.     [de-identified] : Ambulating with a slightly antalgic to antalgic gait.  Station:  Normal.  [de-identified] : Appearance:  Well-developed, well-nourished female in no acute distress.   [de-identified] : Radiographs, which were taken in the office today, two views of the right knee, reveals moderate osteoarthritis of the right knee.

## 2025-02-25 NOTE — ASSESSMENT
[FreeTextEntry1] :  Lab were reviewed in detail with the patient. consider covid vax. All preventative measures were reviewed with the patient and the patient is due for and agrees to the following as outlined  in the plan  below.

## 2025-02-25 NOTE — PHYSICAL EXAM
[No Acute Distress] : no acute distress [Well Nourished] : well nourished [Well Developed] : well developed [Well-Appearing] : well-appearing [Normal Sclera/Conjunctiva] : normal sclera/conjunctiva [PERRL] : pupils equal round and reactive to light [EOMI] : extraocular movements intact [Normal Outer Ear/Nose] : the outer ears and nose were normal in appearance [Normal Oropharynx] : the oropharynx was normal [No JVD] : no jugular venous distention [No Lymphadenopathy] : no lymphadenopathy [Supple] : supple [Thyroid Normal, No Nodules] : the thyroid was normal and there were no nodules present [No Respiratory Distress] : no respiratory distress  [No Accessory Muscle Use] : no accessory muscle use [Clear to Auscultation] : lungs were clear to auscultation bilaterally [Normal Rate] : normal rate  [Regular Rhythm] : with a regular rhythm [Normal S1, S2] : normal S1 and S2 [No Murmur] : no murmur heard [No Carotid Bruits] : no carotid bruits [No Abdominal Bruit] : a ~M bruit was not heard ~T in the abdomen [No Varicosities] : no varicosities [Pedal Pulses Present] : the pedal pulses are present [No Edema] : there was no peripheral edema [No Palpable Aorta] : no palpable aorta [No Extremity Clubbing/Cyanosis] : no extremity clubbing/cyanosis [Soft] : abdomen soft [Non Tender] : non-tender [Non-distended] : non-distended [No Masses] : no abdominal mass palpated [No HSM] : no HSM [Normal Bowel Sounds] : normal bowel sounds [Normal Posterior Cervical Nodes] : no posterior cervical lymphadenopathy [Normal Anterior Cervical Nodes] : no anterior cervical lymphadenopathy [No CVA Tenderness] : no CVA  tenderness [No Spinal Tenderness] : no spinal tenderness [No Joint Swelling] : no joint swelling [Grossly Normal Strength/Tone] : grossly normal strength/tone [No Rash] : no rash [Coordination Grossly Intact] : coordination grossly intact [No Focal Deficits] : no focal deficits [Normal Gait] : normal gait [Deep Tendon Reflexes (DTR)] : deep tendon reflexes were 2+ and symmetric [Normal Affect] : the affect was normal [Normal Insight/Judgement] : insight and judgment were intact [de-identified] : right breast    10  oclock breast position

## 2025-02-25 NOTE — HEALTH RISK ASSESSMENT
[No] : In the past 12 months have you used drugs other than those required for medical reasons? No [0] : 2) Feeling down, depressed, or hopeless: Not at all (0) [Never] : Never [No falls in past year] : Patient reported no falls in the past year [PHQ-2 Negative - No further assessment needed] : PHQ-2 Negative - No further assessment needed [Yes] : takes [NO] : No [None] : None [Alone] : lives alone [] :  [Fully functional (bathing, dressing, toileting, transferring, walking, feeding)] : Fully functional (bathing, dressing, toileting, transferring, walking, feeding) [Fully functional (using the telephone, shopping, preparing meals, housekeeping, doing laundry, using] : Fully functional and needs no help or supervision to perform IADLs (using the telephone, shopping, preparing meals, housekeeping, doing laundry, using transportation, managing medications and managing finances) [Smoke Detector] : smoke detector [Carbon Monoxide Detector] : carbon monoxide detector [Seat Belt] :  uses seat belt [Sunscreen] : uses sunscreen [With Patient/Caregiver] : , with patient/caregiver [de-identified] : pul  rehab [de-identified] : reg [[id number]] : 0 [EyeExamDate] : 06/24 [Change in mental status noted] : No change in mental status noted [Reports changes in hearing] : Reports no changes in hearing [MammogramDate] : 12/24 [PapSmearDate] : 12/24 [BoneDensityDate] : 01/25 [ColonoscopyDate] : 11/24 [AdvancecareDate] : 2/25/25

## 2025-03-12 NOTE — REASON FOR VISIT
[Follow-Up] : a follow-up visit [TextBox_44] :  SOB, (+) PAH, , seasonal/environmental Allergies, GERD/esophageal spasms, (+)JACKIE, mild asthma

## 2025-03-12 NOTE — ADDENDUM
[FreeTextEntry1] : Documented by Omid Barrera acting as a scribe for Dr. Isak Rizo on 03/12/2025. All medical record entries made by the Scribe were at my, Dr. Isak Rzio's, direction and personally dictated by me on 03/12/2025. I have reviewed the chart and agree that the record accurately reflects my personal performance of the history, physical exam, assessment and plan. I have also personally directed, reviewed, and agree with the discharge instructions.

## 2025-03-12 NOTE — REVIEW OF SYSTEMS
Please send in one time dose of xanax for Marlon to take prior to his MRI tomorrow with instructions about when to take it. Do NOT take it too soon, get to MRI location 45 minutes early and take it after checking in. Thank you RH   [Negative] : Heme/Lymph

## 2025-03-12 NOTE — HISTORY OF PRESENT ILLNESS
[TextBox_4] :  Ms. DE JESUS is a 67 year female nonsmoker with a history of CAD, anxiety, Colonic polyps, COVID 19 (9/2023), HLD, Hypothyroid, Osteoarthritis, Osteopenia, Prediabetes, seasonal/environmental allergies, GERD who now comes in for a follow-up pulmonary evaluation. Her chief complaint is  -she notes feeling generally well -she notes having her DD made  -she notes having random and intermittent chest pain  -she notes chest tightness  -she notes going away to [place]  -she notes exercising (cardio and stretches)  -she notes globus pharyngeus -she notes coughing to clear  -she notes diet is good -she notes appetite is stable  -she denies any headaches, nausea, emesis, fever, chills, sweats, chest pressure, wheezing, diarrhea, constipation, dysphagia, vertigo, arthralgias, myalgias, leg swelling, itchy eyes, itchy ears, heartburn, reflux, or sour taste in the mouth.

## 2025-03-12 NOTE — PHYSICAL EXAM
[No Acute Distress] : no acute distress [Normal Oropharynx] : normal oropharynx [III] : Mallampati Class: III [No Neck Mass] : no neck mass [Normal Appearance] : normal appearance [Normal Rate/Rhythm] : normal rate/rhythm [Normal S1, S2] : normal s1, s2 [No Murmurs] : no murmurs [No Resp Distress] : no resp distress [Clear to Auscultation Bilaterally] : clear to auscultation bilaterally [No Abnormalities] : no abnormalities [Benign] : benign [Normal Gait] : normal gait [No Clubbing] : no clubbing [No Cyanosis] : no cyanosis [No Edema] : no edema [FROM] : FROM [Normal Color/ Pigmentation] : normal color/ pigmentation [No Focal Deficits] : no focal deficits [Oriented x3] : oriented x3 [Normal Affect] : normal affect [TextBox_68] :  I:E 1:3; Clear

## 2025-03-12 NOTE — ASSESSMENT
[FreeTextEntry1] : History for possible dense breast tissue right breast  No suspicious findings clinically or on ultrasound  Additional options including breast MRI, were reviewed  The patient would like to continue close surveillance  She will follow-up in 6 weeks  She will follow-up with me sooner if needed  A total of 30 minutes was spent in consultation

## 2025-03-12 NOTE — PROCEDURE
[FreeTextEntry1] : Full PFT reveals normal flows; FEV1 was  2.66L which is 112% of predicted; normal lung volumes; normal diffusion at 13.37, which is 66% of predicted; normal flow volume loop. PFTs were performed to evaluate for SOB  FENO was 12; a normal value being less than 25 Fractional exhaled nitric oxide (FENO) is regarded as a simple, noninvasive method for assessing eosinophilic airway inflammation. Produced by a variety of cells within the lung, nitric oxide (NO) concentrations are generally low in healthy individuals. However, high concentrations of NO appear to be involved in nonspecific host defense mechanisms and chronic inflammatory diseases such as asthma. The American Thoracic Society (ATS) therefore has recommended using FENO to aid in the diagnosis and monitoring of eosinophilic airway inflammation and asthma, and for identifying steroid responsive individuals whose chronic respiratory symptoms may be caused by airway inflammation.   The American Thoracic Society (ATS) strongly recommends the use of FeNO measurement to aid in the assessment, management, and long-term monitoring of asthma. In their 2011 clinical practice guideline, the ATS emphasizes the importance of using FeNO.

## 2025-03-12 NOTE — PROCEDURE
[FreeTextEntry3] : Bilateral breast ultrasound  The patient reports dense breast tissue in the upper outer right breast  Four-quadrant survey the right breast demonstrates no developing mass  There is no suspicious of adenopathy  Four-quadrant survey the left breast demonstrates no developing mass  BI-RADS 2

## 2025-03-12 NOTE — ASSESSMENT
[FreeTextEntry1] :  Ms. DE JESUS is a 67 year female nonsmoker with a history of CAD, anxiety, Colonic polyps, COVID 19 (9/2023), HLD, Hypothyroid, Osteoarthritis, Osteopenia, Prediabetes, seasonal/environmental Allergies, GERD who now comes in for a follow-up pulmonary evaluation for SOB, (+) PAH, seasonal/environmental Allergies, GERD/esophageal spasms, ?JACKIE - now with PAH confirmed, OSAS; s/p URI bronchospasm - mild asthma - mild reflux Sx    The patient's SOB is felt to be multifactorial: -out of shape/overweight -poor mechanics of breathing -Pulmonary     -(+) PAH     -? mild asthma -Cardiac(Patcha)     -Valve issues   Problem 1: (+) PAH - WHO group 1 -s/p RHC with Romel Osorio (confirmed) -s/p CTA  of chest (+) -s/p BNP blood test WNL - move to combination -add Opsynvi daily (ETRA/PDE-5) - on hold pending Bx for OSAS (DD) - ECHO 3 months post Rx  -follow-up LFTs/CBC monthly -follow-up BNP, 6-minute walk test, PFTs, and yearly ECHO once controlled  CAT scans are the only radiological modality to identify abnormalities w/in the lungs with regards to nodules/masses/lymph nodes. Risks, benefits were reviewed in detail. The guidelines for abnormalities include follow up CT scans at various intervals which could range from 6 weeks to 1 year intervals. If there is a change for the worse then consideration for a biopsy will be considered if you are a candidate. Second opinion evaluation with a thoracic surgeon or an interventional radiologist could be offered. -PAH is a rare form of pulmonary hypertension that affects the arteries in the lungs and the right side of the heart, which is responsible for receiving blood that is low in oxygen and pumping it to the lungs for reoxygenation. There is slower blood flow to the lungs in PAH patients, which the heart tries to compensate for by working harder to pump blood through the lungs. This can cause damage to the organ and result in symptoms like shortness of breath, chest pain, and dizziness. There is no cure for PAH, and it often leads to right heart failure and death. Untreated, less than half of the patients survive beyond 3 years.  Problem 2: mild asthma (RF: allergies, GERD) (active)  -s/p Albuterol via inhaler 2 puffs up to Q6H, pre-exercise PRN -s/p Symbicort 160 2 inhalations BID -Inhaler technique reviewed as well as oral hygiene technique reviewed with patient. Avoidance of cold air, extremes of temperature, rescue inhaler should be used before exercise. Order of medication reviewed with patient. Recommended use of a cool mist humidifier in the bedroom.  Asthma is believed to be caused by inherited (genetic) and environmental factor, but its exact is unknown. Asthma may be triggered by allergens, lung infections, or irritants in the air, Asthma triggers are different for each person.   Problem 3: AllergySinus -s/p Blood work to include: asthma panel (+), food IgE panel(-), IgE level(-), eosinophil level (-), vitamin D level - Azelastine/Flonase (PRN)  Environmental measures for allergies were encouraged including mattress and pillow cover, air purifier, and environmental controls.  Problem 3A: Low Vitamin D -on Rx -Low vitamin D levels have been associated with asthma exacerbations and increased allergic symptoms. The goal, based on recent information, is maintaining levels between 50-70 and low normal is 30. Recommended 50,000 units every two weeks to once a month depending on the level.   Problem 4: GERD  -s/p Pepcid 40 mg QHS -recommend Reflux Gourmet PRN  -Rule of 2s: avoid eating too much, eating too late, eating too spicy, eating two hours before bed. - Things to avoid including overeating, spicy foods, tight clothing, eating within two hours of bed, this list is not all inclusive. - For treatments of reflux, possible options discussed including diet control, H2 blockers, PPIs, as well as coating motility agents discussed as treatment options. Timing of meals and proximity of last meal to sleep were discussed. If symptoms persist, a formal gastrointestinal evaluation is needed.   Problem 5: (+) JACKIE (associated with PAH)- 17.4 (moderate) -s/p  home sleep study - Sunny DD pending Rx, Oura ring  -Sleep apnea is associated with adverse clinical consequences which can affect most organ systems. Cardiovascular disease risk includes arrhythmias, atrial fibrillation, hypertension, coronary artery disease, and stroke. Metabolic disorders include diabetes type 2, non-alcoholic fatty liver disease. Mood disorder especially depression; and cognitive decline especially in the elderly. Associations with chronic reflux/Barretts esophagus some but not all inclusive. -Reasons include arousal consistent with hypopnea; respiratory events most prominent in REM sleep or supine position; therefore sleep staging and body position are important for accurate diagnosis and estimation of AHI.   Problem 6: Cardiac (Valve issues) -Recommend cardiac follow up evaluation with cardiologist if needed(Siddhartha) (s/p ARNULFO Osorio)    Problem 7: overweight/out of shape -Recommended Gopal Horton's 10-day detox diet and book. - Weight loss, exercise and diet control were discussed and are highly encouraged. Treatment options were given such as aqua therapy, and contacting a nutritionist. Recommended to use the elliptical, stationary bike, less use of treadmill. Mindful eating was explained to the patient. Obesity is associated with worsening asthma, SOB, and potential for cardiac disease, diabetes, and other underlying medical conditions.   Problem 8: Poor mechanics of breathing -Recommended [employer] Hof and Buteyko breathing techniques -Recommended [email or web address] and to YouTube "aerobic exercises for seniors" -Proper breathing techniques were reviewed with an emphasis on exhalation. Patient instructed to breath in for 1 second and out for four seconds. Patient was encouraged not to talk while walking.   Problem 9: Health Maintenance -recommended pulmonary rehab ([facility]) -s/p RSV  -recommended Sanotize anti viral nasal spray in case of viral infection -s/p flu shot -recommended strep pneumonia vaccines: Prevnar-20 vaccine, follow by Pneumo vaccine 23 one year following -recommended early intervention for URIs -recommended regular osteoporosis evaluations -recommended early dermatological evaluations -recommended after the age of 50 to the age of 70, colonoscopy every 5 years   f/u in 6-8 weeks pt is encouraged to call or fax the office with any questions or concerns.

## 2025-03-12 NOTE — HISTORY OF PRESENT ILLNESS
[FreeTextEntry1] : Referred Dr. Tosha Judge PCP. Patient presents right breast dense tissue, skin thickening. Denies palpable mass, breast pain, redness on the skin, nipple discharge. No family history of breast cancer.  History of breast biopsies reported as benign. Last mammogram performed 12/23/2024 at [facility]  Dr Judge was concerned about dense breast tissue in the upper outer right breast

## 2025-05-05 NOTE — HISTORY OF PRESENT ILLNESS
[de-identified] : 67 year old female presents for evaluation of scratchy voice for last 4 months. States she was diagnosed with pulmonary hypertension and sleep apnea, states started using mouth piece. States voice becomes scratchy and weak throughout day for last 4 months. Is also constantly clearing her throat. Seeing GI at end of month, does have GERD and getting endoscopy. Denies difficulty swallowing. Takes pepcid daily.  Also with post nasal drip, has seasonal allergies. Using Flonase as needed most days a week, and azelastine as needed. Takes allegra daily. Went for allergy shots as a child.

## 2025-05-05 NOTE — ASSESSMENT
[FreeTextEntry1] : 67 year old female presents with 4-5 months of scratchy voice and post nasal drip. On exam, slight glottic gap, erythema and edema consistent with acid reflux. no lesions seen  - will proceed to start lifestyle regiment to reduce overproduction of acid and reduce laryngeal reflux including avoiding caffein, alcohol, eating before bed, spicy and fatty foods, and head elevation at night etc. Handout detailing regiment also given  - reflux medication - discussed with patient can consider following up with laryngology for strobe or voice therapy, patient would like to start with conservative management and is pending upper endoscopy with GI

## 2025-05-05 NOTE — PROCEDURE
[de-identified] : Procedure performed: laryngeal Endoscopy- Diagnostic Pre-op/post op indication: dysphonia Verbal and/or written consent obtained from patient, Patient was unable to cooperate with mirror Scope #: 3, flexible fiber optic telescope used  Scope was introduced through the nose passed on the floor of the nose to the nasopharynx and then followed down the soft palate to the lower pharynx. The tongue Base, Larynx, Hypopharynx were examined. Base of tongue was symmetric, vallecular was clear, epiglottis was not deformed, subglottis/ pyriform and posterior pharyngeal walls were clear - slight glottic gap. No erythema, edema, pooling of secretions, masses or lesions. Airway patent, no foreign body visualized. Postcricoid area with moderate erythema and mild edema. + intra-artenoid bar. No pooling of secretions. True vocal cords, vestibular folds, ventricles, pyriform sinuses, and aryepiglottic folds appear normal bilaterally. Vocal cords mobile with good contact b/l. .

## 2025-05-05 NOTE — CONSULT LETTER
[FreeTextEntry1] : Dear Dr. WILIAM LORENZ  I had the pleasure of evaluating your patient LASHELL DE JESUS, thank you for allowing us to participate in their care. please see full note detailing our visit below. If you have any questions, please do not hesitate to call me and I would be happy to discuss further.   Sg Hackett M.D. Attending Physician,   Department of Otolaryngology - Head and Neck Surgery [facility]  Office: [phone] Fax: [phone]

## 2025-05-05 NOTE — END OF VISIT
[FreeTextEntry3] : I personally saw and examined  the patient in detail.  I spoke to PARRIS Capellan regarding the assessment and plan of care. I performed the procedures and relevant physical exam.  I have reviewed the above assessment and plan of care and I agree.  I have made changes to the body of the note wherever necessary and appropriate

## 2025-06-08 NOTE — HISTORY OF PRESENT ILLNESS
[FreeTextEntry1] : she feels well  Patient has right breast dense tissue, skin thickening. Denies breast pain, redness to the skin, palpable masses  Last mammogram performed 12/23/2024 at [facility]

## 2025-06-08 NOTE — HISTORY OF PRESENT ILLNESS
[FreeTextEntry1] : she feels well  Patient has right breast dense tissue, skin thickening. Denies breast pain, redness to the skin, palpable masses  Last mammogram performed 12/23/2024 at [facility]

## 2025-06-08 NOTE — PROCEDURE
[FreeTextEntry3] : ultrasound right breast  Patient has a history for dense breast tissue  Targeted exam of the upper outer right breast is uremarkable BIRADS 2

## 2025-06-08 NOTE — PHYSICAL EXAM
[Normocephalic] : normocephalic [Atraumatic] : atraumatic [Supple] : supple [No Supraclavicular Adenopathy] : no supraclavicular adenopathy [Examined in the supine and seated position] : examined in the supine and seated position [No dominant masses] : no dominant masses in right breast  [No Nipple Retraction] : no right nipple retraction [No Nipple Discharge] : no right nipple discharge [No Axillary Lymphadenopathy] : no left axillary lymphadenopathy [No Edema] : no edema [No Rashes] : no rashes [No Ulceration] : no ulceration

## 2025-06-08 NOTE — ASSESSMENT
[FreeTextEntry1] : History for right breast dense tissue  No suspicious findings clinically or on ultrasound  stable exam f/u as needed 20 minutes were spent in consultation

## 2025-06-10 NOTE — PHYSICAL EXAM
[Normal] : normal rate, regular rhythm, normal S1 and S2 and no murmur heard [de-identified] : Tenderness to maxillary sinuses

## 2025-06-10 NOTE — HISTORY OF PRESENT ILLNESS
[FreeTextEntry8] : Acute care visit PCP Dr. Judge   Patient reports 3-day history of sore throat, head pressure, nasal congestion, chills, clogged left ear  Has been taking Flonase and Allegra Denies fever, cough, shortness of breath

## 2025-06-26 NOTE — ADDENDUM
[FreeTextEntry1] : Documented by Celeste Olsen acting as a scribe for Dr. Isak Rizo on 06/26/2025. All medical record entries made by the Scribe were at my, Dr. Isak Rizo's, direction and personally dictated by me on 06/26/2025.  I have reviewed the chart and agree that the record accurately reflects my personal performance of the history, physical exam, assessment and plan. I have also personally directed, reviewed, and agree with the discharge instructions.

## 2025-06-26 NOTE — ASSESSMENT
[FreeTextEntry1] :  Ms. DE JESUS is a 67 year female nonsmoker with a history of CAD, anxiety, Colonic polyps, COVID 19 (9/2023), HLD, Hypothyroid, Osteoarthritis, Osteopenia, Prediabetes, seasonal/environmental Allergies, GERD who now comes in for a follow-up pulmonary evaluation for SOB, (+) PAH, seasonal/environmental Allergies, GERD/esophageal spasms, ?JACKIE - now with PAH confirmed, OSAS; s/p URI bronchospasm - mild asthma - mild reflux Sx (confirmed by EGT/ENT)   The patient's SOB is felt to be multifactorial: -out of shape/overweight -poor mechanics of breathing -Pulmonary     -(+) PAH     -? mild asthma -Cardiac(Patcha)     -Valve issues   Problem 1: (+) PAH - WHO group 1 -s/p RHC with Romel Osorio (confirmed) -s/p CTA  of chest (+) -s/p BNP blood test WNL - move to combination -add Opsynvi daily (ETRA/PDE-5) - on hold pending Bx for OSAS (DD) - ECHO 3 months post Rx  -follow-up LFTs/CBC monthly -follow-up BNP, 6-minute walk test, PFTs, and yearly ECHO once controlled  CAT scans are the only radiological modality to identify abnormalities w/in the lungs with regards to nodules/masses/lymph nodes. Risks, benefits were reviewed in detail. The guidelines for abnormalities include follow up CT scans at various intervals which could range from 6 weeks to 1 year intervals. If there is a change for the worse then consideration for a biopsy will be considered if you are a candidate. Second opinion evaluation with a thoracic surgeon or an interventional radiologist could be offered. -PAH is a rare form of pulmonary hypertension that affects the arteries in the lungs and the right side of the heart, which is responsible for receiving blood that is low in oxygen and pumping it to the lungs for reoxygenation. There is slower blood flow to the lungs in PAH patients, which the heart tries to compensate for by working harder to pump blood through the lungs. This can cause damage to the organ and result in symptoms like shortness of breath, chest pain, and dizziness. There is no cure for PAH, and it often leads to right heart failure and death. Untreated, less than half of the patients survive beyond 3 years.  Problem 2: mild asthma (RF: allergies, GERD) (active)  -s/p Albuterol via inhaler 2 puffs up to Q6H, pre-exercise PRN -s/p Symbicort 160 2 inhalations BID -Inhaler technique reviewed as well as oral hygiene technique reviewed with patient. Avoidance of cold air, extremes of temperature, rescue inhaler should be used before exercise. Order of medication reviewed with patient. Recommended use of a cool mist humidifier in the bedroom.  Asthma is believed to be caused by inherited (genetic) and environmental factor, but its exact is unknown. Asthma may be triggered by allergens, lung infections, or irritants in the air, Asthma triggers are different for each person.   Problem 3: AllergySinus -s/p Blood work to include: asthma panel (+), food IgE panel(-), IgE level(-), eosinophil level (-), vitamin D level - Azelastine/Flonase (PRN)  Environmental measures for allergies were encouraged including mattress and pillow cover, air purifier, and environmental controls.  Problem 3A: Low Vitamin D -on Rx -Low vitamin D levels have been associated with asthma exacerbations and increased allergic symptoms. The goal, based on recent information, is maintaining levels between 50-70 and low normal is 30. Recommended 50,000 units every two weeks to once a month depending on the level.   Problem 4: GERD/LPR/HH -add Protonix 40 mg QAM, pre-breakfast -ENT (Lew), GI (Cece)  -Pepcid 40 mg QHS -recommend Reflux Gourmet PRN  -Rule of 2s: avoid eating too much, eating too late, eating too spicy, eating two hours before bed. - Things to avoid including overeating, spicy foods, tight clothing, eating within two hours of bed, this list is not all inclusive. - For treatments of reflux, possible options discussed including diet control, H2 blockers, PPIs, as well as coating motility agents discussed as treatment options. Timing of meals and proximity of last meal to sleep were discussed. If symptoms persist, a formal gastrointestinal evaluation is needed.   Problem 5: (+) JACKIE (associated with PAH)- 17.4 (moderate) -DD (HSS) -s/p  home sleep study - Sunny DD Rx, Oura ring  -Sleep apnea is associated with adverse clinical consequences which can affect most organ systems. Cardiovascular disease risk includes arrhythmias, atrial fibrillation, hypertension, coronary artery disease, and stroke. Metabolic disorders include diabetes type 2, non-alcoholic fatty liver disease. Mood disorder especially depression; and cognitive decline especially in the elderly. Associations with chronic reflux/Barretts esophagus some but not all inclusive. -Reasons include arousal consistent with hypopnea; respiratory events most prominent in REM sleep or supine position; therefore sleep staging and body position are important for accurate diagnosis and estimation of AHI.   Problem 6: Cardiac (Valve issues) -Recommend cardiac follow up evaluation with cardiologist if needed(Siddhartha) (s/p [facility] [place])    Problem 7: overweight/out of shape -Recommended Gopal Horton's 10-day detox diet and book. - Weight loss, exercise and diet control were discussed and are highly encouraged. Treatment options were given such as aqua therapy, and contacting a nutritionist. Recommended to use the elliptical, stationary bike, less use of treadmill. Mindful eating was explained to the patient. Obesity is associated with worsening asthma, SOB, and potential for cardiac disease, diabetes, and other underlying medical conditions.   Problem 8: Poor mechanics of breathing -Recommended Saman Byrd and Gustavo breathing techniques -Recommended [email or web address] and to YouTube "aerobic exercises for seniors" -Proper breathing techniques were reviewed with an emphasis on exhalation. Patient instructed to breath in for 1 second and out for four seconds. Patient was encouraged not to talk while walking.   Problem 9: Health Maintenance -recommended pulmonary rehab ([facility]) -s/p RSV  -recommended Sanotize anti viral nasal spray in case of viral infection -s/p flu shot -recommended strep pneumonia vaccines: Prevnar-20 vaccine, follow by Pneumo vaccine 23 one year following -recommended early intervention for URIs -recommended regular osteoporosis evaluations -recommended early dermatological evaluations -recommended after the age of 50 to the age of 70, colonoscopy every 5 years   f/u in 6-8 weeks pt is encouraged to call or fax the office with any questions or concerns.

## 2025-06-26 NOTE — HISTORY OF PRESENT ILLNESS
[TextBox_4] :  Ms. DE JESUS is a 67 year female nonsmoker with a history of CAD, anxiety, Colonic polyps, COVID 19 (9/2023), HLD, Hypothyroid, Osteoarthritis, Osteopenia, Prediabetes, seasonal/environmental allergies, GERD who now comes in for a follow-up pulmonary evaluation. Her chief complaint is voice issues  -she notes using the oral appliance for sleep apnea for 3 months  -she notes good quality of sleep  -she notes her bottom four teeth are turning mccabe (Dr. Viramontes-endodontist) -she notes she needs four root canals, and her teeth bleached  -she notes needing an adjustment with the oral appliance (Uriel Correa) -she notes s/p endoscopy  -she notes constantly clearing her voice by the middle of the day  -she notes s/p ENT (Lew) -she notes seeing Dr. Zhao-gastroenterologist  -she notes taking Pepcid, prescribed by Mc  -she notes hiatal hernia  -she notes scratchy voice for the past few weeks  -she notes joint pain that causes her to tense up in her hands  -she denies joint pain traveling to her shoulders/arms   -she denies any headaches, nausea, emesis, fever, chills, sweats, chest pain, chest pressure, coughing, wheezing, palpitations, diarrhea, constipation, dysphagia, vertigo, leg swelling, itchy eyes, itchy ears, heartburn, reflux, or sour taste in the mouth

## 2025-07-15 NOTE — HISTORY OF PRESENT ILLNESS
[FreeTextEntry8] : Pt  here s/p fall and hitting her head in a marble shower in [place].  She had a slip and fall and did not have any koss of conciousness . Happened on July 9.  No headache, no dizziness, no nausea.  No visual changes. She had fallen backward to the right side  of her head. Had some shoulder and neck pain. Had slipped and punched herself in the chest so she had a bruise in the chest. In the AM when she gets up now she is have  what she describes as a vertigo type o feeling that then resolves. Has had headaches occasionally that she attributes to allergies. She has been taking advil  alternating with tylenol.  Feels head hevainess.  No nausea no sensiivity to light.  Has returned to  work and found it a bit difficult    only  on the first day.  But she is worried now about the injury  because of  what her friend  has told her.

## 2025-07-16 NOTE — PHYSICAL EXAM
[de-identified] :  Right Knee: Range of Motion in Degrees                                   Claimant:     Normal:  Flexion Active          135               135-degrees  Flexion Passive       135               135-degrees  Extension Active      0-5                0-5-degrees  Extension Passive   0-5                0-5-degrees    No weakness to flexion/extension. No evidence of instability in the AP plane or varus or valgus stress.  Negative Lachman.  Negative pivot shift.  Negative anterior drawer test.  Negative posterior drawer test.  Negative Zander.  Negative Apley grind.  No medial or lateral joint line tenderness.  Positive tenderness over the medial and lateral facet of the patella.  Positive patellofemoral crepitations.  No lateral tilting patella.  No patella apprehension.  Positive crepitation in the medial and lateral femoral condyle.  No proximal or distal swelling, edema or tenderness.  No gross motor or sensory deficits. Mild intra-articular swelling.  2+ DP and PT pulses. No varus or valgus malalignment.  Skin is intact.  No rashes, scars or lesions.   Right Hip: Range of Motion in Degrees: 	                                 Claimant:	   Normal:	 Flexion (Active) 	                 120 	   120-degrees	 Flexion (Passive)	                 120	   120-degrees	 Extension (Active)	                 -30	   -30-degrees	 Extension (Passive)	 -30	   -30-degrees	 Abduction (Active)	                 45-50	   78-30-qrjappo	 Abduction (Passive)	 45-50	   50-36-vcnolve	 Adduction (Active)  	 20-30	   81-31-kuuaeid	 Adduction (Passive)	 20-30	   12-34-ggzwvwi	 Internal Rotation (Active) 	 35	   35-degrees	 Internal Rotation (Passive)	 35	   35-degrees	 External Rotation (Active)	 45	   45-degrees	 External Rotation (Passive)	 45	   45-degrees	  Tender over the hip flexor.  Pain with resisted extension of the hip.  No tenderness with internal or external rotation or axial load.  No tenderness to palpation over the greater trochanter.  Negative Trendelenburg.  No tenderness with resisted abduction.  No weakness to flexion, extension, abduction or adduction.  No evidence of instability.  No motor or sensory deficits.  2+ DP and PT pulses.  Skin is intact.  No scars, rashes or lesions.     Left Hip: Range of Motion in Degrees:                                                     Claimant:                                Normal: Flexion (Active)                         120                                         120-degrees Flexion (Passive)                      120                                         120-degrees Extension (Active)                     -30                                         -30-degrees Extension (Passive)                  -30                                         -30-degrees Abduction (Active)                    45-50                                      83-37-nuaaboc Abduction (Passive)                 45-50                                      85-64-zldhcab Adduction (Active)                    20-30                                      10-00-vrqlnpy Adduction (Passive)                 20-30                                      79-66-ehorusv Internal Rotation (Active)         35                                           35-degrees Internal Rotation (Passive)      35                                           35-degrees External Rotation (Active)        45                                           45-degrees External Rotation (Passive)     45                                           45-degrees   No tenderness with internal or external rotation or axial load.  No tenderness to palpation over the greater trochanter.  Negative Trendelenburg.  No tenderness with resisted abduction.  No weakness to flexion, extension, abduction or adduction.  No evidence of instability.  No motor or sensory deficits.  2+ DP and PT pulses.  Skin is intact.  No scars, rashes or lesions.     [de-identified] : Gait and Station:  Ambulating with a slightly antalgic to antalgic gait.  Normal Station.  [de-identified] : Appearance:  Well-developed, well-nourished female in no acute distress.   [de-identified] : Radiographs, two to three views of the right hip and pelvis taken in the office today, reveal no acute fracture or dislocation.

## 2025-07-16 NOTE — REASON FOR VISIT
[Follow-Up Visit] : a follow-up visit for [FreeTextEntry2] : her right knee and new concern for her right hip

## 2025-07-16 NOTE — ADDENDUM
[FreeTextEntry1] : This note was written by Gregor New on 07/16/2025, acting as a scribe for LENORE SWEET, MARIE/L, PA

## 2025-07-16 NOTE — DISCUSSION/SUMMARY
[de-identified] : At this time, due to strain of right hip flexor and osteoarthritis of the right knee, the patient will do some physical therapy at [facility], and then return back to the office in a period of 4 weeks.

## 2025-07-16 NOTE — HISTORY OF PRESENT ILLNESS
[de-identified] : The patient comes in today with complaints of right hip pain and right knee pain.

## 2025-07-18 NOTE — HISTORY OF PRESENT ILLNESS
[TextBox_4] :  Ms. DE JESUS is a 68 year female nonsmoker with a history of CAD, anxiety, Colonic polyps, COVID 19 (9/2023), HLD, Hypothyroid, Osteoarthritis, Osteopenia, Prediabetes, seasonal/environmental allergies, GERD who now comes in for a follow-up pulmonary evaluation.   -she notes emotional stress with her daughter and grandkid  -she notes recent travel to [place]  -she notes slipping in the shower while on vacation  -she notes she went for CT of her head  -she notes she thinks she had a concussion  -she notes seeing Dr. Viramontes (endodontist)  -she notes seeing Dr. Correa for trauma on her teeth  -she notes Dr. Correa offered a CPAP machine -she notes not wanting to follow through with the CPAP  -she notes she feels like she is sleeping better -she notes interest in exploring other options -she notes her breathing is stable  -she denies any headaches, nausea, emesis, fever, chills, sweats, chest pain, chest pressure, coughing, wheezing, palpitations, diarrhea, constipation, dysphagia, vertigo, arthralgias, myalgias, leg swelling, itchy eyes, itchy ears, heartburn, reflux, or sour taste in the mouth

## 2025-07-18 NOTE — ASSESSMENT
[FreeTextEntry1] :  Ms. DE JESUS is a 68 year female nonsmoker with a history of CAD, anxiety, Colonic polyps, COVID 19 (9/2023), HLD, Hypothyroid, Osteoarthritis, Osteopenia, Prediabetes, seasonal/environmental Allergies, GERD who now comes in for a follow-up pulmonary evaluation for SOB, (+) PAH, seasonal/environmental Allergies, GERD/esophageal spasms, ?JACKIE - now with PAH confirmed, OSAS; s/p URI bronchospasm - mild asthma - mild reflux Sx (confirmed by EGT/ENT); DD in place with residual OSAS   The patient's SOB is felt to be multifactorial: -out of shape/overweight -poor mechanics of breathing -Pulmonary     -(+) PAH     -? mild asthma -Cardiac(Patcha)     -Valve issues   Problem 1: (+) PAH - WHO group 1 -s/p RHC with Romel Osorio (confirmed) -s/p CTA  of chest (+) -s/p BNP blood test WNL - move to combination -add Opsynvi daily (ETRA/PDE-5) - on hold pending Bx for OSAS (DD) - ECHO 3 months post Rx  -follow-up LFTs/CBC monthly -follow-up BNP, 6-minute walk test, PFTs, and yearly ECHO once controlled  CAT scans are the only radiological modality to identify abnormalities w/in the lungs with regards to nodules/masses/lymph nodes. Risks, benefits were reviewed in detail. The guidelines for abnormalities include follow up CT scans at various intervals which could range from 6 weeks to 1 year intervals. If there is a change for the worse then consideration for a biopsy will be considered if you are a candidate. Second opinion evaluation with a thoracic surgeon or an interventional radiologist could be offered. -PAH is a rare form of pulmonary hypertension that affects the arteries in the lungs and the right side of the heart, which is responsible for receiving blood that is low in oxygen and pumping it to the lungs for reoxygenation. There is slower blood flow to the lungs in PAH patients, which the heart tries to compensate for by working harder to pump blood through the lungs. This can cause damage to the organ and result in symptoms like shortness of breath, chest pain, and dizziness. There is no cure for PAH, and it often leads to right heart failure and death. Untreated, less than half of the patients survive beyond 3 years.  Problem 2: mild asthma (RF: allergies, GERD) (active)  -s/p Albuterol via inhaler 2 puffs up to Q6H, pre-exercise PRN -s/p Symbicort 160 2 inhalations BID -Inhaler technique reviewed as well as oral hygiene technique reviewed with patient. Avoidance of cold air, extremes of temperature, rescue inhaler should be used before exercise. Order of medication reviewed with patient. Recommended use of a cool mist humidifier in the bedroom.  Asthma is believed to be caused by inherited (genetic) and environmental factor, but its exact is unknown. Asthma may be triggered by allergens, lung infections, or irritants in the air, Asthma triggers are different for each person.   Problem 3: AllergySinus -s/p Blood work to include: asthma panel (+), food IgE panel(-), IgE level(-), eosinophil level (-), vitamin D level - Azelastine/Flonase (PRN)  Environmental measures for allergies were encouraged including mattress and pillow cover, air purifier, and environmental controls.  Problem 3A: Low Vitamin D -on Rx -Low vitamin D levels have been associated with asthma exacerbations and increased allergic symptoms. The goal, based on recent information, is maintaining levels between 50-70 and low normal is 30. Recommended 50,000 units every two weeks to once a month depending on the level.   Problem 4: GERD/LPR/HH -add Protonix 40 mg QAM, pre-breakfast -ENT (Lew), GI (Cece)  -Pepcid 40 mg QHS -recommend Reflux Gourmet PRN  -Rule of 2s: avoid eating too much, eating too late, eating too spicy, eating two hours before bed. - Things to avoid including overeating, spicy foods, tight clothing, eating within two hours of bed, this list is not all inclusive. - For treatments of reflux, possible options discussed including diet control, H2 blockers, PPIs, as well as coating motility agents discussed as treatment options. Timing of meals and proximity of last meal to sleep were discussed. If symptoms persist, a formal gastrointestinal evaluation is needed.   Problem 5: (+) JACKIE (associated with PAH)- 17.4 (moderate) -DD (HSS) -s/p  home sleep study - Sunny DD Rx, Oura ring   -add eXciteOSA therapy device -initiate Zepbound -Sleep apnea is associated with adverse clinical consequences which can affect most organ systems. Cardiovascular disease risk includes arrhythmias, atrial fibrillation, hypertension, coronary artery disease, and stroke. Metabolic disorders include diabetes type 2, non-alcoholic fatty liver disease. Mood disorder especially depression; and cognitive decline especially in the elderly. Associations with chronic reflux/Barretts esophagus some but not all inclusive. -Reasons include arousal consistent with hypopnea; respiratory events most prominent in REM sleep or supine position; therefore sleep staging and body position are important for accurate diagnosis and estimation of AHI.   Problem 6: Cardiac (Valve issues) -Recommend cardiac follow up evaluation with cardiologist if needed(Siddhartha) (s/p [facility] [place])    Problem 7: overweight/out of shape -initiate Zepbound  -Recommended Gopal Horton's 10-day detox diet and book. - Weight loss, exercise and diet control were discussed and are highly encouraged. Treatment options were given such as aqua therapy, and contacting a nutritionist. Recommended to use the elliptical, stationary bike, less use of treadmill. Mindful eating was explained to the patient. Obesity is associated with worsening asthma, SOB, and potential for cardiac disease, diabetes, and other underlying medical conditions.   Problem 8: Poor mechanics of breathing -Recommended [employer] and [place] breathing techniques -Recommended [email or web address] and to YouTube "aerobic exercises for seniors" -Proper breathing techniques were reviewed with an emphasis on exhalation. Patient instructed to breath in for 1 second and out for four seconds. Patient was encouraged not to talk while walking.   Problem 9: Health Maintenance -recommended pulmonary rehab ([facility]) -s/p RSV  -recommended Sanotize anti viral nasal spray in case of viral infection -s/p flu shot -recommended strep pneumonia vaccines: Prevnar-20 vaccine, follow by Pneumo vaccine 23 one year following -recommended early intervention for URIs -recommended regular osteoporosis evaluations -recommended early dermatological evaluations -recommended after the age of 50 to the age of 70, colonoscopy every 5 years   f/u in 6-8 weeks pt is encouraged to call or fax the office with any questions or concerns.

## 2025-07-18 NOTE — ADDENDUM
[FreeTextEntry1] : Documented by Celeste Olsen acting as a scribe for Dr. Isak Rizo on 07/18/2025. All medical record entries made by the Scribe were at my, Dr. Isak Rizo's, direction and personally dictated by me on 07/18/2025.  I have reviewed the chart and agree that the record accurately reflects my personal performance of the history, physical exam, assessment and plan. I have also personally directed, reviewed, and agree with the discharge instructions.

## 2025-07-18 NOTE — PROCEDURE
[FreeTextEntry1] : Full PFT reveals normal flows; FEV1 was 2.65 L which is 112% of predicted; normal lung volumes; normal diffusion at 15.74, which is 78% of predicted; normal flow volume loop.   PFTs were performed to evaluate for SOB  6 minute walk test reveals a low saturation of  90% with no evidence of dyspnea or fatigue; walked 489  meters